# Patient Record
Sex: FEMALE | Race: OTHER | HISPANIC OR LATINO | URBAN - METROPOLITAN AREA
[De-identification: names, ages, dates, MRNs, and addresses within clinical notes are randomized per-mention and may not be internally consistent; named-entity substitution may affect disease eponyms.]

---

## 2020-05-09 ENCOUNTER — EMERGENCY (EMERGENCY)
Facility: HOSPITAL | Age: 25
LOS: 1 days | Discharge: HOME | End: 2020-05-09
Attending: EMERGENCY MEDICINE | Admitting: EMERGENCY MEDICINE
Payer: MEDICAID

## 2020-05-09 VITALS
RESPIRATION RATE: 16 BRPM | DIASTOLIC BLOOD PRESSURE: 87 MMHG | SYSTOLIC BLOOD PRESSURE: 131 MMHG | OXYGEN SATURATION: 100 % | TEMPERATURE: 98 F | HEART RATE: 77 BPM

## 2020-05-09 PROCEDURE — 99283 EMERGENCY DEPT VISIT LOW MDM: CPT

## 2020-05-09 RX ORDER — ACETAMINOPHEN 500 MG
975 TABLET ORAL ONCE
Refills: 0 | Status: COMPLETED | OUTPATIENT
Start: 2020-05-09 | End: 2020-05-09

## 2020-05-09 RX ORDER — OXYCODONE HYDROCHLORIDE 5 MG/1
1 TABLET ORAL
Qty: 8 | Refills: 0
Start: 2020-05-09 | End: 2020-05-10

## 2020-05-09 RX ORDER — OXYCODONE HYDROCHLORIDE 5 MG/1
5 TABLET ORAL ONCE
Refills: 0 | Status: DISCONTINUED | OUTPATIENT
Start: 2020-05-09 | End: 2020-05-09

## 2020-05-09 RX ORDER — ACETAMINOPHEN 500 MG
1 TABLET ORAL
Qty: 15 | Refills: 0
Start: 2020-05-09 | End: 2020-05-13

## 2020-05-09 RX ADMIN — Medication 975 MILLIGRAM(S): at 10:55

## 2020-05-09 RX ADMIN — OXYCODONE HYDROCHLORIDE 5 MILLIGRAM(S): 5 TABLET ORAL at 10:54

## 2020-05-09 NOTE — ED ADULT NURSE NOTE - OBJECTIVE STATEMENT
Pt came to ED for left upper molar pain, no broken tooth noted, gums noted to be swollen above tooth. Pt states that she is in extensive pain due to tooth.

## 2020-05-09 NOTE — ED PROVIDER NOTE - CLINICAL SUMMARY MEDICAL DECISION MAKING FREE TEXT BOX
Rosa Isela: 7 months pregnant, dental pain around 2 erupting wisdom teeth. nl jaw and breathing. no pain below tongue. COVID crisis prevented dental visit. will treat pain a refer to dentist. discussion about opiate in pregnancy had, on antibiotic already Rosa Isela: 7 months pregnant, dental pain around 2 erupting wisdom teeth. nl jaw and breathing. no pain below tongue. COVID crisis prevented dental visit. will treat pain a refer to dentist. discussion about opiate in pregnancy had, on antibiotic already. given return precautions

## 2020-05-09 NOTE — ED PROVIDER NOTE - CONSTITUTIONAL, MLM
normal... Well appearing, awake, alert, oriented to person, place, time/situation and in no apparent distress. Crying in pain.

## 2020-05-09 NOTE — ED PROVIDER NOTE - NSFOLLOWUPINSTRUCTIONS_ED_ALL_ED_FT
Follow up with Dental this week    Take Tylenol 650mg every 8hours as needed for moderate pain.  Take Oxycodone 5mg every 6 hours as needed for severe pain- CAUTION do not drive, will cause drowsiness.      Worsening, continued or new concerning symptoms, fever, vomiting, swelling, discharge, return to the emergency department.

## 2020-05-09 NOTE — ED PROVIDER NOTE - PATIENT PORTAL LINK FT
You can access the FollowMyHealth Patient Portal offered by [facility] by registering at the following website: [email or web address]. By joining [employer]’s FollowMyHealth portal, you will also be able to view your health information using other applications (apps) compatible with our system.

## 2020-05-09 NOTE — ED PROVIDER NOTE - OBJECTIVE STATEMENT
ID#[id number]:  24 y/o female  7 months pregnant presents to ED c/o left upper dental pain x few days. Tolerating PO. Has appointment w/ gyn for routine visit tomorrow. No complications with pregnancy. No fever, chills, sore throat, swelling, discharge, pelvic pain/vaginal bleeding, dysuria. Went to medical clinic yesterday and given ibuprofen and antibiotic, does not know name.

## 2020-05-09 NOTE — ED PROVIDER NOTE - ENMT, MLM
Airway patent, Nasal mucosa clear. Mouth with normal mucosa. Throat has no vesicles, no oropharyngeal exudates and uvula is midline. Dentition intact. +left upper and lower wisdom tooth partially out. NO swelling, no tenderness, no discharge/fluctuance.

## 2020-05-09 NOTE — ED ADULT NURSE NOTE - CHPI ED NUR SYMPTOMS NEG
no vomiting/no chills/no nausea/no weakness/no pain/no decreased eating/drinking/no fever/no tingling/no dizziness

## 2020-05-09 NOTE — ED PROVIDER NOTE - ATTENDING CONTRIBUTION TO CARE
I performed a history and physical exam of the patient and discussed their management with the resident and /or advanced care provider. I reviewed the resident and /or ACP's note and agree with the documented findings and plan of care. My medical decision making and observations are found above.  Lungs clear, nl jaw opening, nl voice,